# Patient Record
Sex: FEMALE | Race: WHITE
[De-identification: names, ages, dates, MRNs, and addresses within clinical notes are randomized per-mention and may not be internally consistent; named-entity substitution may affect disease eponyms.]

---

## 2019-04-07 ENCOUNTER — HOSPITAL ENCOUNTER (EMERGENCY)
Dept: HOSPITAL 19 - COL.ER | Age: 24
Discharge: HOME | End: 2019-04-07
Payer: MEDICAID

## 2019-04-07 VITALS — HEART RATE: 100 BPM

## 2019-04-07 VITALS — SYSTOLIC BLOOD PRESSURE: 110 MMHG | TEMPERATURE: 98 F | DIASTOLIC BLOOD PRESSURE: 67 MMHG

## 2019-04-07 VITALS — WEIGHT: 169.32 LBS | BODY MASS INDEX: 28.91 KG/M2 | HEIGHT: 64.02 IN

## 2019-04-07 DIAGNOSIS — J02.9: Primary | ICD-10-CM

## 2019-04-07 NOTE — NUR
G2L1, 39.1 weeks gestation with EDC of 04/13/2019. Patient to ER with sore
throat. Patient denies any vaginal bleeding or leaking of fluid and states
baby is active. Patient sitting up in chair. FHR and contraction monitors
placed and explained.

## 2019-04-10 ENCOUNTER — HOSPITAL ENCOUNTER (INPATIENT)
Dept: HOSPITAL 19 - OB | Age: 24
LOS: 2 days | Discharge: HOME | End: 2019-04-12
Attending: OBSTETRICS & GYNECOLOGY | Admitting: OBSTETRICS & GYNECOLOGY
Payer: MEDICAID

## 2019-04-10 VITALS — SYSTOLIC BLOOD PRESSURE: 91 MMHG | HEART RATE: 81 BPM | DIASTOLIC BLOOD PRESSURE: 53 MMHG

## 2019-04-10 VITALS — HEART RATE: 81 BPM | SYSTOLIC BLOOD PRESSURE: 106 MMHG | DIASTOLIC BLOOD PRESSURE: 56 MMHG

## 2019-04-10 VITALS — HEART RATE: 79 BPM | DIASTOLIC BLOOD PRESSURE: 45 MMHG | SYSTOLIC BLOOD PRESSURE: 93 MMHG

## 2019-04-10 VITALS — TEMPERATURE: 98.7 F | SYSTOLIC BLOOD PRESSURE: 101 MMHG | HEART RATE: 88 BPM | DIASTOLIC BLOOD PRESSURE: 56 MMHG

## 2019-04-10 VITALS — DIASTOLIC BLOOD PRESSURE: 78 MMHG | SYSTOLIC BLOOD PRESSURE: 116 MMHG | HEART RATE: 76 BPM | TEMPERATURE: 98.1 F

## 2019-04-10 VITALS — DIASTOLIC BLOOD PRESSURE: 50 MMHG | SYSTOLIC BLOOD PRESSURE: 113 MMHG | HEART RATE: 85 BPM

## 2019-04-10 VITALS — DIASTOLIC BLOOD PRESSURE: 46 MMHG | HEART RATE: 77 BPM | TEMPERATURE: 98.1 F | SYSTOLIC BLOOD PRESSURE: 108 MMHG

## 2019-04-10 VITALS — HEART RATE: 86 BPM | DIASTOLIC BLOOD PRESSURE: 53 MMHG | SYSTOLIC BLOOD PRESSURE: 111 MMHG

## 2019-04-10 VITALS — TEMPERATURE: 98.4 F | SYSTOLIC BLOOD PRESSURE: 105 MMHG | HEART RATE: 889 BPM | DIASTOLIC BLOOD PRESSURE: 60 MMHG

## 2019-04-10 VITALS — SYSTOLIC BLOOD PRESSURE: 113 MMHG | DIASTOLIC BLOOD PRESSURE: 57 MMHG | HEART RATE: 81 BPM

## 2019-04-10 VITALS — SYSTOLIC BLOOD PRESSURE: 116 MMHG | DIASTOLIC BLOOD PRESSURE: 49 MMHG | TEMPERATURE: 97.7 F | HEART RATE: 87 BPM

## 2019-04-10 VITALS — DIASTOLIC BLOOD PRESSURE: 58 MMHG | HEART RATE: 87 BPM | SYSTOLIC BLOOD PRESSURE: 110 MMHG

## 2019-04-10 VITALS — SYSTOLIC BLOOD PRESSURE: 91 MMHG | DIASTOLIC BLOOD PRESSURE: 47 MMHG | HEART RATE: 77 BPM

## 2019-04-10 VITALS — DIASTOLIC BLOOD PRESSURE: 56 MMHG | SYSTOLIC BLOOD PRESSURE: 101 MMHG | HEART RATE: 88 BPM | TEMPERATURE: 98.7 F

## 2019-04-10 VITALS — HEART RATE: 84 BPM | DIASTOLIC BLOOD PRESSURE: 58 MMHG | SYSTOLIC BLOOD PRESSURE: 95 MMHG

## 2019-04-10 VITALS — HEIGHT: 64.02 IN | WEIGHT: 171.3 LBS | BODY MASS INDEX: 29.24 KG/M2

## 2019-04-10 VITALS — SYSTOLIC BLOOD PRESSURE: 107 MMHG | HEART RATE: 87 BPM | DIASTOLIC BLOOD PRESSURE: 51 MMHG

## 2019-04-10 VITALS — DIASTOLIC BLOOD PRESSURE: 86 MMHG | SYSTOLIC BLOOD PRESSURE: 106 MMHG | HEART RATE: 87 BPM

## 2019-04-10 VITALS — HEART RATE: 89 BPM | DIASTOLIC BLOOD PRESSURE: 58 MMHG | SYSTOLIC BLOOD PRESSURE: 109 MMHG

## 2019-04-10 VITALS — DIASTOLIC BLOOD PRESSURE: 58 MMHG | HEART RATE: 87 BPM | SYSTOLIC BLOOD PRESSURE: 115 MMHG

## 2019-04-10 DIAGNOSIS — O34.211: Primary | ICD-10-CM

## 2019-04-10 DIAGNOSIS — N85.8: ICD-10-CM

## 2019-04-10 DIAGNOSIS — Z3A.39: ICD-10-CM

## 2019-04-10 DIAGNOSIS — F41.9: ICD-10-CM

## 2019-04-10 LAB
BASOPHILS # BLD: 0 10*3/UL (ref 0–0.2)
BASOPHILS NFR BLD AUTO: 0.3 % (ref 0–2)
EOSINOPHIL # BLD: 0.2 10*3/UL (ref 0–0.7)
EOSINOPHIL NFR BLD: 1.6 % (ref 0–4)
ERYTHROCYTE [DISTWIDTH] IN BLOOD BY AUTOMATED COUNT: 12.7 % (ref 11.5–14.5)
GRANULOCYTES # BLD AUTO: 66.3 % (ref 42.2–75.2)
HCT VFR BLD AUTO: 35 % (ref 37–47)
HGB BLD-MCNC: 11.7 G/DL (ref 12.5–16)
LYMPHOCYTES # BLD: 2 10*3/UL (ref 1.2–3.4)
LYMPHOCYTES NFR BLD: 21.2 % (ref 20–51)
MCH RBC QN AUTO: 29 PG (ref 27–31)
MCHC RBC AUTO-ENTMCNC: 33 G/DL (ref 33–37)
MCV RBC AUTO: 88 FL (ref 80–100)
MONOCYTES # BLD: 0.9 10*3/UL (ref 0.1–0.6)
MONOCYTES NFR BLD AUTO: 9.3 % (ref 1.7–9.3)
NEUTROPHILS # BLD: 6.3 10*3/UL (ref 1.4–6.5)
PLATELET # BLD AUTO: 237 K/MM3 (ref 130–400)
PMV BLD AUTO: 11 FL (ref 7.4–10.4)
RBC # BLD AUTO: 4 M/MM3 (ref 4.1–5.3)

## 2019-04-10 NOTE — NUR
0835-Patient to pacu via bed. Recieved report from MALINI Ferrell. Patient A&O
x4. Denies pain. IVF to right forearm IV. Abdominal binder, fundal massage
firm. Lochia WNL. Dressing C/D/I. Marrero to DD, clear yellow urine return.
Updated on recovery plan of care

## 2019-04-10 NOTE — NUR
0610-Recieved report from NATALIE Martin. Patient WR with EFM in place. Moderate
variability, +Acels VSS. Assessment complete.
0615-Late deceleration in FHR down to 90BPM with spontaneous return to
baseline. Repositioned RL.
0624-Dr. Mendoza updated, see physician notification.
0724-Patient off EFM ambulatory to OR.
0728-In OR sitting upright on table
0730-EFM on, reactive FHR baseline 120bpm, +Accels spinal placed by
MALINI Ferrell, patient tolerated well.
0739-off efm WL.

## 2019-04-10 NOTE — NUR
Pt ambulatory to room 210 with significant other. Clean gown on. EFM and TOCO
explained, applied and tracing well. Pt denies contractions, LOF or vaginal
bleeding. Pt reports good fetal movement. Consents explained and signed. IV
started and labs obtained via IV site. LR bolus infusing without difficulties.
Pepcid and Zofran also administered. Educated pt to scrub incision site for 3
minutes. Plan of care explained to pt who verbalizes understanding. Call light
within reach.

## 2019-04-11 VITALS — TEMPERATURE: 98.3 F | DIASTOLIC BLOOD PRESSURE: 54 MMHG | SYSTOLIC BLOOD PRESSURE: 107 MMHG | HEART RATE: 90 BPM

## 2019-04-11 VITALS — HEART RATE: 77 BPM | TEMPERATURE: 97.7 F | SYSTOLIC BLOOD PRESSURE: 115 MMHG | DIASTOLIC BLOOD PRESSURE: 58 MMHG

## 2019-04-11 VITALS — HEART RATE: 77 BPM | DIASTOLIC BLOOD PRESSURE: 62 MMHG | TEMPERATURE: 98.7 F | SYSTOLIC BLOOD PRESSURE: 110 MMHG

## 2019-04-11 VITALS — DIASTOLIC BLOOD PRESSURE: 58 MMHG | TEMPERATURE: 98.3 F | HEART RATE: 88 BPM | SYSTOLIC BLOOD PRESSURE: 95 MMHG

## 2019-04-11 LAB
HCT VFR BLD AUTO: 34.6 % (ref 37–47)
HGB BLD-MCNC: 11.4 G/DL (ref 12.5–16)

## 2019-04-12 VITALS — DIASTOLIC BLOOD PRESSURE: 64 MMHG | SYSTOLIC BLOOD PRESSURE: 106 MMHG | HEART RATE: 94 BPM

## 2020-03-08 ENCOUNTER — HOSPITAL ENCOUNTER (EMERGENCY)
Dept: HOSPITAL 19 - COL.ER | Age: 25
Discharge: HOME | End: 2020-03-08
Payer: SELF-PAY

## 2020-03-08 VITALS — HEART RATE: 82 BPM | SYSTOLIC BLOOD PRESSURE: 116 MMHG | DIASTOLIC BLOOD PRESSURE: 72 MMHG

## 2020-03-08 VITALS — WEIGHT: 134.26 LBS | BODY MASS INDEX: 22.92 KG/M2 | HEIGHT: 64.02 IN

## 2020-03-08 VITALS — TEMPERATURE: 98.9 F

## 2020-03-08 DIAGNOSIS — R10.2: ICD-10-CM

## 2020-03-08 DIAGNOSIS — Z87.891: ICD-10-CM

## 2020-03-08 DIAGNOSIS — N93.9: Primary | ICD-10-CM

## 2020-03-08 LAB
ANION GAP SERPL CALC-SCNC: 10 MMOL/L (ref 7–16)
BASOPHILS # BLD: 0.1 10*3/UL (ref 0–0.2)
BASOPHILS NFR BLD AUTO: 0.6 % (ref 0–2)
BUN SERPL-MCNC: 17 MG/DL (ref 7–17)
CALCIUM SERPL-MCNC: 9.3 MG/DL (ref 8.4–10.2)
CHLORIDE SERPL-SCNC: 103 MMOL/L (ref 98–107)
CO2 SERPL-SCNC: 28 MMOL/L (ref 22–30)
CREAT SERPL-SCNC: 0.76 UMOL/L (ref 0.52–1.25)
CRP SERPL-MCNC: < 0.5 MG/DL (ref 0–0.9)
EOSINOPHIL # BLD: 0.1 10*3/UL (ref 0–0.7)
EOSINOPHIL NFR BLD: 1.2 % (ref 0–4)
ERYTHROCYTE [DISTWIDTH] IN BLOOD BY AUTOMATED COUNT: 12.1 % (ref 11.5–14.5)
GLUCOSE SERPL-MCNC: 108 MG/DL (ref 74–106)
GRANULOCYTES # BLD AUTO: 72.1 % (ref 42.2–75.2)
HCG SERPL QL: NEGATIVE
HCT VFR BLD AUTO: 40.4 % (ref 37–47)
HGB BLD-MCNC: 13.6 G/DL (ref 12.5–16)
LYMPHOCYTES # BLD: 1.7 10*3/UL (ref 1.2–3.4)
LYMPHOCYTES NFR BLD: 18.1 % (ref 20–51)
MCH RBC QN AUTO: 30 PG (ref 27–31)
MCHC RBC AUTO-ENTMCNC: 34 G/DL (ref 33–37)
MCV RBC AUTO: 90 FL (ref 80–100)
MONOCYTES # BLD: 0.7 10*3/UL (ref 0.1–0.6)
MONOCYTES NFR BLD AUTO: 7.7 % (ref 1.7–9.3)
NEUTROPHILS # BLD: 6.9 10*3/UL (ref 1.4–6.5)
PH UR STRIP.AUTO: 7 [PH] (ref 5–8)
PLATELET # BLD AUTO: 282 K/MM3 (ref 130–400)
PMV BLD AUTO: 10.4 FL (ref 7.4–10.4)
POTASSIUM SERPL-SCNC: 3.7 MMOL/L (ref 3.4–5)
RBC # BLD AUTO: 4.51 M/MM3 (ref 4.1–5.3)
RBC # UR: (no result) /HPF
SODIUM SERPL-SCNC: 141 MMOL/L (ref 137–145)
SP GR UR STRIP.AUTO: 1.02 (ref 1–1.03)
URN COLLECT METHOD CLASS: (no result)

## 2022-05-13 ENCOUNTER — HOSPITAL ENCOUNTER (OUTPATIENT)
Dept: HOSPITAL 19 - COL.RAD | Age: 27
End: 2022-05-13
Payer: MEDICAID

## 2022-05-13 DIAGNOSIS — R10.11: Primary | ICD-10-CM
